# Patient Record
Sex: MALE | Race: BLACK OR AFRICAN AMERICAN | NOT HISPANIC OR LATINO | URBAN - METROPOLITAN AREA
[De-identification: names, ages, dates, MRNs, and addresses within clinical notes are randomized per-mention and may not be internally consistent; named-entity substitution may affect disease eponyms.]

---

## 2018-06-07 PROBLEM — Z00.00 ENCOUNTER FOR PREVENTIVE HEALTH EXAMINATION: Status: ACTIVE | Noted: 2018-06-07

## 2018-06-18 ENCOUNTER — OUTPATIENT (OUTPATIENT)
Dept: OUTPATIENT SERVICES | Facility: HOSPITAL | Age: 68
LOS: 1 days | End: 2018-06-18
Payer: MEDICARE

## 2018-06-18 ENCOUNTER — APPOINTMENT (OUTPATIENT)
Dept: MRI IMAGING | Facility: HOSPITAL | Age: 68
End: 2018-06-18

## 2018-06-18 PROCEDURE — A9585: CPT

## 2018-06-18 PROCEDURE — 70552 MRI BRAIN STEM W/DYE: CPT | Mod: 26

## 2018-06-18 PROCEDURE — 70552 MRI BRAIN STEM W/DYE: CPT

## 2018-10-05 ENCOUNTER — EMERGENCY (EMERGENCY)
Facility: HOSPITAL | Age: 68
LOS: 1 days | Discharge: HOME | End: 2018-10-05
Attending: EMERGENCY MEDICINE | Admitting: EMERGENCY MEDICINE
Payer: MEDICARE

## 2018-10-05 VITALS
OXYGEN SATURATION: 100 % | SYSTOLIC BLOOD PRESSURE: 163 MMHG | TEMPERATURE: 98 F | DIASTOLIC BLOOD PRESSURE: 88 MMHG | RESPIRATION RATE: 20 BRPM | HEART RATE: 56 BPM

## 2018-10-05 DIAGNOSIS — R10.84 GENERALIZED ABDOMINAL PAIN: ICD-10-CM

## 2018-10-05 DIAGNOSIS — I10 ESSENTIAL (PRIMARY) HYPERTENSION: ICD-10-CM

## 2018-10-05 LAB
ALBUMIN SERPL ELPH-MCNC: 5.2 G/DL — HIGH (ref 3.3–5)
ALP SERPL-CCNC: 85 U/L — SIGNIFICANT CHANGE UP (ref 40–120)
ALT FLD-CCNC: 13 U/L — SIGNIFICANT CHANGE UP (ref 10–45)
ANION GAP SERPL CALC-SCNC: 21 MMOL/L — HIGH (ref 5–17)
APPEARANCE UR: CLEAR — SIGNIFICANT CHANGE UP
APTT BLD: 36 SEC — SIGNIFICANT CHANGE UP (ref 27.5–37.4)
AST SERPL-CCNC: 27 U/L — SIGNIFICANT CHANGE UP (ref 10–40)
BACTERIA # UR AUTO: PRESENT /HPF
BASOPHILS NFR BLD AUTO: 0.2 % — SIGNIFICANT CHANGE UP (ref 0–2)
BILIRUB SERPL-MCNC: 0.5 MG/DL — SIGNIFICANT CHANGE UP (ref 0.2–1.2)
BILIRUB UR-MCNC: NEGATIVE — SIGNIFICANT CHANGE UP
BUN SERPL-MCNC: 12 MG/DL — SIGNIFICANT CHANGE UP (ref 7–23)
CALCIUM SERPL-MCNC: 10 MG/DL — SIGNIFICANT CHANGE UP (ref 8.4–10.5)
CHLORIDE SERPL-SCNC: 100 MMOL/L — SIGNIFICANT CHANGE UP (ref 96–108)
CO2 SERPL-SCNC: 22 MMOL/L — SIGNIFICANT CHANGE UP (ref 22–31)
COLOR SPEC: YELLOW — SIGNIFICANT CHANGE UP
COMMENT - URINE: SIGNIFICANT CHANGE UP
CREAT SERPL-MCNC: 0.83 MG/DL — SIGNIFICANT CHANGE UP (ref 0.5–1.3)
DIFF PNL FLD: ABNORMAL
GLUCOSE SERPL-MCNC: 131 MG/DL — HIGH (ref 70–99)
GLUCOSE UR QL: NEGATIVE — SIGNIFICANT CHANGE UP
HCT VFR BLD CALC: 39.8 % — SIGNIFICANT CHANGE UP (ref 39–50)
HGB BLD-MCNC: 13.4 G/DL — SIGNIFICANT CHANGE UP (ref 13–17)
INR BLD: 1.12 — SIGNIFICANT CHANGE UP (ref 0.88–1.16)
KETONES UR-MCNC: >=80 MG/DL
LACTATE SERPL-SCNC: 1.5 MMOL/L — SIGNIFICANT CHANGE UP (ref 0.5–2)
LEUKOCYTE ESTERASE UR-ACNC: NEGATIVE — SIGNIFICANT CHANGE UP
LIDOCAIN IGE QN: 24 U/L — SIGNIFICANT CHANGE UP (ref 7–60)
LYMPHOCYTES # BLD AUTO: 13.1 % — SIGNIFICANT CHANGE UP (ref 13–44)
MCHC RBC-ENTMCNC: 23.4 PG — LOW (ref 27–34)
MCHC RBC-ENTMCNC: 33.7 G/DL — SIGNIFICANT CHANGE UP (ref 32–36)
MCV RBC AUTO: 69.5 FL — LOW (ref 80–100)
MONOCYTES NFR BLD AUTO: 5.1 % — SIGNIFICANT CHANGE UP (ref 2–14)
NEUTROPHILS NFR BLD AUTO: 81.6 % — HIGH (ref 43–77)
NITRITE UR-MCNC: NEGATIVE — SIGNIFICANT CHANGE UP
PH UR: 6.5 — SIGNIFICANT CHANGE UP (ref 5–8)
PLATELET # BLD AUTO: 303 K/UL — SIGNIFICANT CHANGE UP (ref 150–400)
POTASSIUM SERPL-MCNC: 3.5 MMOL/L — SIGNIFICANT CHANGE UP (ref 3.5–5.3)
POTASSIUM SERPL-SCNC: 3.5 MMOL/L — SIGNIFICANT CHANGE UP (ref 3.5–5.3)
PROT SERPL-MCNC: 8.2 G/DL — SIGNIFICANT CHANGE UP (ref 6–8.3)
PROT UR-MCNC: ABNORMAL MG/DL
PROTHROM AB SERPL-ACNC: 12.5 SEC — SIGNIFICANT CHANGE UP (ref 9.8–12.7)
RBC # BLD: 5.73 M/UL — SIGNIFICANT CHANGE UP (ref 4.2–5.8)
RBC # FLD: 18.1 % — HIGH (ref 10.3–16.9)
RBC CASTS # UR COMP ASSIST: < 5 /HPF — SIGNIFICANT CHANGE UP
SODIUM SERPL-SCNC: 143 MMOL/L — SIGNIFICANT CHANGE UP (ref 135–145)
SP GR SPEC: 1.02 — SIGNIFICANT CHANGE UP (ref 1–1.03)
UROBILINOGEN FLD QL: 0.2 E.U./DL — SIGNIFICANT CHANGE UP
WBC # BLD: 5.3 K/UL — SIGNIFICANT CHANGE UP (ref 3.8–10.5)
WBC # FLD AUTO: 5.3 K/UL — SIGNIFICANT CHANGE UP (ref 3.8–10.5)
WBC UR QL: < 5 /HPF — SIGNIFICANT CHANGE UP

## 2018-10-05 PROCEDURE — 93005 ELECTROCARDIOGRAM TRACING: CPT

## 2018-10-05 PROCEDURE — 93010 ELECTROCARDIOGRAM REPORT: CPT

## 2018-10-05 PROCEDURE — 71045 X-RAY EXAM CHEST 1 VIEW: CPT | Mod: 26

## 2018-10-05 PROCEDURE — 74177 CT ABD & PELVIS W/CONTRAST: CPT

## 2018-10-05 PROCEDURE — 99284 EMERGENCY DEPT VISIT MOD MDM: CPT | Mod: 25

## 2018-10-05 PROCEDURE — 96375 TX/PRO/DX INJ NEW DRUG ADDON: CPT

## 2018-10-05 PROCEDURE — 83690 ASSAY OF LIPASE: CPT

## 2018-10-05 PROCEDURE — 85610 PROTHROMBIN TIME: CPT

## 2018-10-05 PROCEDURE — 87086 URINE CULTURE/COLONY COUNT: CPT

## 2018-10-05 PROCEDURE — 96374 THER/PROPH/DIAG INJ IV PUSH: CPT | Mod: XU

## 2018-10-05 PROCEDURE — 85730 THROMBOPLASTIN TIME PARTIAL: CPT

## 2018-10-05 PROCEDURE — 81001 URINALYSIS AUTO W/SCOPE: CPT

## 2018-10-05 PROCEDURE — 80053 COMPREHEN METABOLIC PANEL: CPT

## 2018-10-05 PROCEDURE — 85025 COMPLETE CBC W/AUTO DIFF WBC: CPT

## 2018-10-05 PROCEDURE — 74019 RADEX ABDOMEN 2 VIEWS: CPT | Mod: 26

## 2018-10-05 PROCEDURE — 83605 ASSAY OF LACTIC ACID: CPT

## 2018-10-05 PROCEDURE — 74019 RADEX ABDOMEN 2 VIEWS: CPT

## 2018-10-05 PROCEDURE — 71045 X-RAY EXAM CHEST 1 VIEW: CPT

## 2018-10-05 PROCEDURE — 74177 CT ABD & PELVIS W/CONTRAST: CPT | Mod: 26

## 2018-10-05 PROCEDURE — 36415 COLL VENOUS BLD VENIPUNCTURE: CPT

## 2018-10-05 RX ORDER — ONDANSETRON 8 MG/1
4 TABLET, FILM COATED ORAL ONCE
Qty: 0 | Refills: 0 | Status: COMPLETED | OUTPATIENT
Start: 2018-10-05 | End: 2018-10-05

## 2018-10-05 RX ORDER — SODIUM CHLORIDE 9 MG/ML
1000 INJECTION INTRAMUSCULAR; INTRAVENOUS; SUBCUTANEOUS ONCE
Qty: 0 | Refills: 0 | Status: COMPLETED | OUTPATIENT
Start: 2018-10-05 | End: 2018-10-05

## 2018-10-05 RX ORDER — IOHEXOL 300 MG/ML
30 INJECTION, SOLUTION INTRAVENOUS ONCE
Qty: 0 | Refills: 0 | Status: COMPLETED | OUTPATIENT
Start: 2018-10-05 | End: 2018-10-05

## 2018-10-05 RX ORDER — MORPHINE SULFATE 50 MG/1
4 CAPSULE, EXTENDED RELEASE ORAL ONCE
Qty: 0 | Refills: 0 | Status: DISCONTINUED | OUTPATIENT
Start: 2018-10-05 | End: 2018-10-05

## 2018-10-05 RX ADMIN — SODIUM CHLORIDE 2000 MILLILITER(S): 9 INJECTION INTRAMUSCULAR; INTRAVENOUS; SUBCUTANEOUS at 21:37

## 2018-10-05 RX ADMIN — IOHEXOL 30 MILLILITER(S): 300 INJECTION, SOLUTION INTRAVENOUS at 21:37

## 2018-10-05 RX ADMIN — SODIUM CHLORIDE 1000 MILLILITER(S): 9 INJECTION INTRAMUSCULAR; INTRAVENOUS; SUBCUTANEOUS at 21:06

## 2018-10-05 RX ADMIN — MORPHINE SULFATE 4 MILLIGRAM(S): 50 CAPSULE, EXTENDED RELEASE ORAL at 21:06

## 2018-10-05 RX ADMIN — ONDANSETRON 4 MILLIGRAM(S): 8 TABLET, FILM COATED ORAL at 21:06

## 2018-10-05 NOTE — ED ADULT NURSE REASSESSMENT NOTE - NS ED NURSE REASSESS COMMENT FT1
pt. c/o strong pain to lower abdomen, vomited once clear liquid, turned diaphoretic, zofran and morphine were administered and pt. was sent to xray to r/o perforation.

## 2018-10-05 NOTE — ED ADULT NURSE NOTE - CAS EDN DISCHARGE ASSESSMENT
Alert and oriented to person, place and time/BP elevated, as per pt. he did not take his meds tonight, pt. instructed to take it when home, pt. denies any symptoms.

## 2018-10-06 VITALS
DIASTOLIC BLOOD PRESSURE: 80 MMHG | RESPIRATION RATE: 18 BRPM | OXYGEN SATURATION: 97 % | HEART RATE: 56 BPM | TEMPERATURE: 98 F | SYSTOLIC BLOOD PRESSURE: 180 MMHG

## 2018-10-06 NOTE — ED PROVIDER NOTE - MEDICAL DECISION MAKING DETAILS
abd pain after colonscopy, initial concern for perforation, none on CT and pain improved, stable for dc w strict return instructions.

## 2018-10-06 NOTE — ED PROVIDER NOTE - OBJECTIVE STATEMENT
67 yo with diffuse strong 8/10 non radiating abd pain since dc from colonscopy, worsening, several loose BM's no black or bloody stools, 3 polyps removed no fevers + gassy feeling.

## 2018-10-06 NOTE — ED PROVIDER NOTE - CONSTITUTIONAL, MLM
normal... Well appearing, well nourished, awake, alert, oriented to person, place, time/situation unwell appeairng, diaphoretic

## 2018-10-07 LAB
CULTURE RESULTS: NO GROWTH — SIGNIFICANT CHANGE UP
SPECIMEN SOURCE: SIGNIFICANT CHANGE UP

## 2019-05-18 PROBLEM — I10 ESSENTIAL (PRIMARY) HYPERTENSION: Chronic | Status: ACTIVE | Noted: 2018-10-05

## 2019-06-17 ENCOUNTER — APPOINTMENT (OUTPATIENT)
Dept: NEUROLOGY | Facility: CLINIC | Age: 69
End: 2019-06-17
Payer: MEDICARE

## 2019-06-17 VITALS
SYSTOLIC BLOOD PRESSURE: 140 MMHG | WEIGHT: 126 LBS | BODY MASS INDEX: 19.54 KG/M2 | DIASTOLIC BLOOD PRESSURE: 90 MMHG | HEART RATE: 79 BPM | HEIGHT: 67.5 IN | OXYGEN SATURATION: 97 %

## 2019-06-17 PROCEDURE — 99205 OFFICE O/P NEW HI 60 MIN: CPT

## 2019-06-17 NOTE — HISTORY OF PRESENT ILLNESS
[FreeTextEntry1] : Reason for consult: pain\par \par HPI: HA MIRANDA is a 69 year old man \par \par Several months ago, pain in L neck and L shoulder going down forearm, down to hand as well, all finger tips. This improved after 1 month. Shortly after, developed the same pain down the R arm, electrical pain lasting several seconds to minutes, shooting down from neck to hand, with some tightness at elbow. no triggers. No numbness other than at night when sleeping on the arm that gets better after repositioning. No weakness. No bowel or bladder dysfunction. no leg symptoms, no gait dysfunction. has not tried OTCs.\par \par ROS/Current Sx:\par 10 point ROS reviewed and scanned.\par \par PMHX:\par HTN\par \par MEDS:\par amlodipine\par \par ALL:\par nkda\par \par SHx: no tob, occ etoh, no drugs.\par \par FHx: no neuro.\par \par Vitals: mildly hypertensive, i advised him to discuss this w pmd.\par \par Exam:\par \par AO3.  Normally conversant.  Follows commands, names, and repeats.  Good attention.\par \par PERRL, VFF, EOMI, no nystagmus, face asymmetric but not clearly weak, TUP at midline.\par \par Motor: \par                                                 R:                               L:\par Del                                           5                                5\par Bi                                              5                               5\par Tri                                            5                               5\par Wrist Extensors                      5                               5\par Finger abductors                    5                               5\par                                         5                               5 \par \par HF                                           5                               5\par KE                                           5                               5\par KF                                           5                               5\par DF                                           5                               5\par PF                                           5                               5\par \par Tone                                       R                               L\par UE                                          0                                0 \par LE                                          0                                0\par \par Sensory                                RUE                      LUE                 RLE                LLE     \par LT                                           +                            +                      +                   +\par Vib                                          +                            +                      +                   +\par JPS                                         +                            +                      +                   +\par PP                                         +                            +                      +                   +\par Temp                                     +                            +                      +                   +\par \par Reflexes:\par                                              R                             L                            \par Biceps                                  2                             2\par BR                                        2                             2\par Triceps                                2                              2\par Pat                                        2                            2 \par AJ                                        2                             2\par \par TOES                                    F                            F\par \par \par Coordination:\par                                              R                             L                       \par FTN                                       0                             0 \par COREY                                      0                            0\par HTS                                      0                             0 \par \par Other                                                                          \par  \par \par Gait:  normal, can heel, toe, tandem\par \par                     Assistance: none\par \par \par AP: 68yo w/ L arm pain several months ago that subsided, now with worsened R arm pain X 1 month. Pain is of unclear etiology but per description may be radicular. No numbness or weakness other than when laying down on right side, and this resolves. Face is asymmetric but not clearly weak on either side.  Neurological exam is otherwise normal and reassuring. doubt myelopathy but will r/o with MRI.\par \par - PT, nsaids prn\par - MRI C spine\par - referral to orthopedics\par - RTC after testing and PT\par \par \par \par

## 2019-06-24 ENCOUNTER — APPOINTMENT (OUTPATIENT)
Dept: SURGERY | Facility: CLINIC | Age: 69
End: 2019-06-24
Payer: MEDICARE

## 2019-06-24 VITALS
HEIGHT: 67.5 IN | OXYGEN SATURATION: 97 % | SYSTOLIC BLOOD PRESSURE: 151 MMHG | TEMPERATURE: 98.2 F | DIASTOLIC BLOOD PRESSURE: 92 MMHG | WEIGHT: 124.25 LBS | HEART RATE: 72 BPM | BODY MASS INDEX: 19.27 KG/M2

## 2019-06-24 PROCEDURE — 99203 OFFICE O/P NEW LOW 30 MIN: CPT

## 2019-07-08 ENCOUNTER — FORM ENCOUNTER (OUTPATIENT)
Age: 69
End: 2019-07-08

## 2019-07-09 ENCOUNTER — OUTPATIENT (OUTPATIENT)
Dept: OUTPATIENT SERVICES | Facility: HOSPITAL | Age: 69
LOS: 1 days | End: 2019-07-09
Payer: MEDICARE

## 2019-07-09 ENCOUNTER — APPOINTMENT (OUTPATIENT)
Dept: ORTHOPEDIC SURGERY | Facility: CLINIC | Age: 69
End: 2019-07-09
Payer: MEDICARE

## 2019-07-09 VITALS
SYSTOLIC BLOOD PRESSURE: 130 MMHG | DIASTOLIC BLOOD PRESSURE: 80 MMHG | OXYGEN SATURATION: 97 % | BODY MASS INDEX: 19.39 KG/M2 | HEIGHT: 67.5 IN | HEART RATE: 74 BPM | WEIGHT: 125 LBS

## 2019-07-09 DIAGNOSIS — Z60.2 PROBLEMS RELATED TO LIVING ALONE: ICD-10-CM

## 2019-07-09 DIAGNOSIS — Z78.9 OTHER SPECIFIED HEALTH STATUS: ICD-10-CM

## 2019-07-09 PROCEDURE — 99204 OFFICE O/P NEW MOD 45 MIN: CPT

## 2019-07-09 PROCEDURE — 72050 X-RAY EXAM NECK SPINE 4/5VWS: CPT | Mod: 26

## 2019-07-09 PROCEDURE — 72050 X-RAY EXAM NECK SPINE 4/5VWS: CPT

## 2019-07-09 RX ORDER — AMLODIPINE BESYLATE 10 MG/1
10 TABLET ORAL DAILY
Refills: 0 | Status: ACTIVE | COMMUNITY

## 2019-07-09 SDOH — SOCIAL STABILITY - SOCIAL INSECURITY: PROBLEMS RELATED TO LIVING ALONE: Z60.2
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
## 2019-07-09 NOTE — PHYSICAL EXAM
[de-identified] : XR cervical 7/9/19: multilevel degenerative changes, no spondylolisthesis or dynamic instability, no acute fractures [de-identified] : Physical Exam:\par \par General: patient is well developed, well nourished, in no acute \par distress, alert and oriented x 3. \par \par Mood and affect: normal\par \par Respiratory: no respiratory distress noted\par \par Skin: no scars over spine, skin intact, no erythema, increased warmth\par \par Alignment:The spine is well compensated in the coronal and sagittal plane. There is no asymmetry on Ken Forward Bend Test.\par \par Gait: The patient is able to toe walk and heel walk without difficulty. The patient is able to tandem walk without difficulty.\par \par Palpation: no tenderness to palpation midline along spine or paraspinal region\par \par Range of motion: Cervical and Lumbar spine ROM is full\par \par Neurologic Exam:\par Motor: Manual Muscle testing in the upper and lower extremities is 5 out of 5 in all muscle groups. There is no evidence of muscular atrophy in the upper extremities. Sensory: Sensation to light touch is grossly intact in the upper and lower extremities\par \par Reflexes: DTR are present and symmetric throughout, negative davies bilaterally, negative inverted radial reflex bilaterally, no clonus, plantar responses are flexor\par \par Special tests: Spurlings sign absent. Lhermitte's sign is absent. Straight Leg Raise Negative, Cross Straight Leg Raise Negative, LUTHER Test Negative\par \par Hip Exam: Full painless ROM of bilateral hips\par \par Vascular: Examination of the peripheral vascular system demonstrates no evidence of congestion or edema. no lymphedema bilateral lower extremities, pulses are present and symmetric in both lower extremities.\par \par \par
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
## 2019-07-21 ENCOUNTER — FORM ENCOUNTER (OUTPATIENT)
Age: 69
End: 2019-07-21

## 2019-07-22 ENCOUNTER — APPOINTMENT (OUTPATIENT)
Dept: MRI IMAGING | Facility: CLINIC | Age: 69
End: 2019-07-22
Payer: MEDICARE

## 2019-07-22 ENCOUNTER — OUTPATIENT (OUTPATIENT)
Dept: OUTPATIENT SERVICES | Facility: HOSPITAL | Age: 69
LOS: 1 days | End: 2019-07-22

## 2019-07-22 PROCEDURE — 72141 MRI NECK SPINE W/O DYE: CPT | Mod: 26

## 2019-07-30 ENCOUNTER — APPOINTMENT (OUTPATIENT)
Dept: ORTHOPEDIC SURGERY | Facility: CLINIC | Age: 69
End: 2019-07-30
Payer: MEDICARE

## 2019-07-30 DIAGNOSIS — M48.02 SPINAL STENOSIS, CERVICAL REGION: ICD-10-CM

## 2019-07-30 DIAGNOSIS — M54.12 RADICULOPATHY, CERVICAL REGION: ICD-10-CM

## 2019-07-30 PROCEDURE — 99213 OFFICE O/P EST LOW 20 MIN: CPT

## 2019-08-22 ENCOUNTER — APPOINTMENT (OUTPATIENT)
Dept: ORTHOPEDIC SURGERY | Facility: CLINIC | Age: 69
End: 2019-08-22
Payer: MEDICARE

## 2019-08-22 VITALS — HEIGHT: 67.5 IN | WEIGHT: 125 LBS | BODY MASS INDEX: 19.39 KG/M2 | RESPIRATION RATE: 16 BRPM

## 2019-08-22 DIAGNOSIS — M25.532 PAIN IN LEFT WRIST: ICD-10-CM

## 2019-08-22 DIAGNOSIS — M25.521 PAIN IN RIGHT ELBOW: ICD-10-CM

## 2019-08-22 DIAGNOSIS — M25.531 PAIN IN RIGHT WRIST: ICD-10-CM

## 2019-08-22 DIAGNOSIS — M25.522 PAIN IN LEFT ELBOW: ICD-10-CM

## 2019-08-22 PROCEDURE — 99203 OFFICE O/P NEW LOW 30 MIN: CPT

## 2019-08-22 PROCEDURE — 73110 X-RAY EXAM OF WRIST: CPT | Mod: 50

## 2019-08-22 PROCEDURE — 73070 X-RAY EXAM OF ELBOW: CPT | Mod: 50

## 2019-08-29 ENCOUNTER — APPOINTMENT (OUTPATIENT)
Dept: PHYSICAL MEDICINE AND REHAB | Facility: CLINIC | Age: 69
End: 2019-08-29

## 2019-09-09 PROBLEM — M48.02 CERVICAL STENOSIS OF SPINE: Status: ACTIVE | Noted: 2019-09-09

## 2019-09-09 PROBLEM — M54.12 CERVICAL RADICULAR PAIN: Status: ACTIVE | Noted: 2019-06-17
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
## 2019-09-09 NOTE — PHYSICAL EXAM
[de-identified] : Physical Exam:\par \par General: patient is well developed, well nourished, in no acute \par distress, alert and oriented x 3. \par \par Mood and affect: normal\par \par Respiratory: no respiratory distress noted\par \par Skin: no scars over spine, skin intact, no erythema, increased warmth\par \par Alignment:The spine is well compensated in the coronal and sagittal plane. There is no asymmetry on Ken Forward Bend Test.\par \par Gait: The patient is able to toe walk and heel walk without difficulty. The patient is able to tandem walk without difficulty.\par \par Palpation: no tenderness to palpation midline along spine or paraspinal region\par \par Range of motion: Cervical and Lumbar spine ROM is full\par \par Neurologic Exam:\par Motor: Manual Muscle testing in the upper and lower extremities is 5 out of 5 in all muscle groups. There is no evidence of muscular atrophy in the upper extremities. Sensory: Sensation to light touch is grossly intact in the upper and lower extremities\par \par Reflexes: DTR are present and symmetric throughout, negative davies bilaterally, negative inverted radial reflex bilaterally, no clonus, plantar responses are flexor\par \par Special tests: Spurlings sign absent. Lhermitte's sign is absent. Straight Leg Raise Negative, Cross Straight Leg Raise Negative, LUTHER Test Negative\par \par Hip Exam: Full painless ROM of bilateral hips\par \par Vascular: Examination of the peripheral vascular system demonstrates no evidence of congestion or edema. no lymphedema bilateral lower extremities, pulses are present and symmetric in both lower extremities.\par  [de-identified] : MRI cervical 7/22/19: multilevel degenerative changes, resulting in moderate/severe bilateral foraminal stenosis C3/C4 through C5/C6, C7/T1; small punctate focus of cord signal change right cord at C3/C4, of indeterminate etiology, very likely chronic, no cord compression\par \par XR cervical 7/9/19: multilevel degenerative changes, no spondylolisthesis or dynamic instability, no acute fractures. \par \par

## 2019-09-09 NOTE — DISCUSSION/SUMMARY
[de-identified] : Discussed the results of the patient's history, physical exam, and imaging. Mr. Ramesh reports significant improvement in neck and upper extremity radicular pain since last visit. Primary complaint now is paresthesias that extend from both forearms to all fingers. Neurologically intact. MRI cervical shows multilevel moderate/severe bilateral foraminal stenosis, no cord compression. There is no indication for surgical intervention at this time. I am recommending an evaluation by hand surgery to rule out a peripheral cause of hand paresthesias, referral given.  The patient will follow up with me as needed.  All questions were answered.\par \par

## 2019-10-02 PROBLEM — Z60.2 PERSON LIVING ALONE: Status: ACTIVE | Noted: 2019-07-09

## 2019-10-11 ENCOUNTER — OUTPATIENT (OUTPATIENT)
Dept: OUTPATIENT SERVICES | Facility: HOSPITAL | Age: 69
LOS: 1 days | Discharge: HOME | End: 2019-10-11
Payer: MEDICARE

## 2019-10-11 ENCOUNTER — RX RENEWAL (OUTPATIENT)
Age: 69
End: 2019-10-11

## 2019-10-11 PROCEDURE — 49650 LAP ING HERNIA REPAIR INIT: CPT | Mod: RT

## 2019-10-11 PROCEDURE — S2900 ROBOTIC SURGICAL SYSTEM: CPT | Mod: NC

## 2019-10-11 RX ORDER — DOCUSATE SODIUM 100 MG
1 CAPSULE ORAL
Qty: 14 | Refills: 0
Start: 2019-10-11 | End: 2019-10-17

## 2019-10-11 RX ORDER — OXYCODONE HYDROCHLORIDE 5 MG/1
1 TABLET ORAL
Qty: 15 | Refills: 0
Start: 2019-10-11 | End: 2019-10-14

## 2019-10-21 ENCOUNTER — APPOINTMENT (OUTPATIENT)
Dept: SURGERY | Facility: CLINIC | Age: 69
End: 2019-10-21
Payer: MEDICARE

## 2019-10-21 VITALS
SYSTOLIC BLOOD PRESSURE: 131 MMHG | TEMPERATURE: 97.7 F | OXYGEN SATURATION: 98 % | HEIGHT: 67.5 IN | WEIGHT: 124.5 LBS | HEART RATE: 84 BPM | DIASTOLIC BLOOD PRESSURE: 86 MMHG | BODY MASS INDEX: 19.31 KG/M2

## 2019-10-21 DIAGNOSIS — K40.90 UNILATERAL INGUINAL HERNIA, W/OUT OBSTRUCTION OR GANGRENE, NOT SPECIFIED AS RECURRENT: ICD-10-CM

## 2019-10-21 PROCEDURE — 99024 POSTOP FOLLOW-UP VISIT: CPT

## 2019-11-06 PROBLEM — K40.90 RIGHT INGUINAL HERNIA: Status: ACTIVE | Noted: 2019-07-09

## 2019-11-06 NOTE — PHYSICAL EXAM
[Normal Breath Sounds] : Normal breath sounds [Normal Heart Sounds] : normal heart sounds [Alert] : alert [Oriented to Person] : oriented to person [Oriented to Place] : oriented to place [Oriented to Time] : oriented to time [Calm] : calm [JVD] : no jugular venous distention  [Abdominal Masses] : No abdominal masses [Abdomen Tenderness] : ~T ~M No abdominal tenderness [Tender] : was nontender [Enlarged] : not enlarged [Purpura] : no purpura  [de-identified] : TESSA. Pam. Appropriate. Comfortable. [de-identified] : Pupils equal. No Scleral Icterus. NCAT. [de-identified] : Supple. Trachea midline. No overt lymphadenopathy. No JVD [de-identified] : Abdomen is soft, non tender and non distended. Do not appreciate any overt mass. No overt hernia detected. the incisions are healing well. No recurrence noted clinically.

## 2019-11-06 NOTE — ASSESSMENT
[FreeTextEntry1] : 69 year old male. Doing well s/p hernia surgery. We discussed natural scar maturation and gradual return to normal activity. I answered all questions. May return as needed.

## 2019-11-06 NOTE — HISTORY OF PRESENT ILLNESS
[de-identified] : 69 year old male. Now s/p minimally invasive repair of right inguinal hernia with mesh. Doing well. Denies any pain, fevers, or issues, No issues with urination and he feels great.

## 2023-04-26 NOTE — ED ADULT NURSE NOTE - OBJECTIVE STATEMENT
Called and lm on [employer] to call back and schedule a physical appt. [employer] message also sent. pt. presented with c/o lower abdomen pain, n/v/d s/p colonoscopy in the morning, pt. denies chest pain, difficulty breathing, dizziness, blood in stool or vomit, any urinary symptoms.

## 2023-05-10 NOTE — ED PROVIDER NOTE - TOBACCO USE
Impression: Corneal abrasion of right eye, initial encounter: S05. 01XA. Plan: bandage lens removed in office. epithelium now closed. mild iritis. stop ofloxacin. start maxitrol qid OD x 1 week then d/c.
Never smoker

## 2023-05-27 ENCOUNTER — APPOINTMENT (OUTPATIENT)
Dept: CT IMAGING | Facility: HOSPITAL | Age: 73
End: 2023-05-27
Payer: MEDICARE

## 2023-05-27 ENCOUNTER — OUTPATIENT (OUTPATIENT)
Dept: OUTPATIENT SERVICES | Facility: HOSPITAL | Age: 73
LOS: 1 days | End: 2023-05-27
Payer: MEDICARE

## 2023-05-27 LAB — POCT ISTAT CREATININE: 1.1 MG/DL — SIGNIFICANT CHANGE UP (ref 0.5–1.3)

## 2023-05-27 PROCEDURE — 71275 CT ANGIOGRAPHY CHEST: CPT

## 2023-05-27 PROCEDURE — 71275 CT ANGIOGRAPHY CHEST: CPT | Mod: 26,MH

## 2023-05-27 PROCEDURE — 82565 ASSAY OF CREATININE: CPT

## 2023-05-31 ENCOUNTER — APPOINTMENT (OUTPATIENT)
Dept: CARDIOTHORACIC SURGERY | Facility: CLINIC | Age: 73
End: 2023-05-31
Payer: MEDICARE

## 2023-05-31 ENCOUNTER — NON-APPOINTMENT (OUTPATIENT)
Age: 73
End: 2023-05-31

## 2023-05-31 VITALS
HEART RATE: 95 BPM | SYSTOLIC BLOOD PRESSURE: 136 MMHG | BODY MASS INDEX: 17.07 KG/M2 | DIASTOLIC BLOOD PRESSURE: 101 MMHG | RESPIRATION RATE: 16 BRPM | WEIGHT: 110 LBS | HEIGHT: 67.5 IN | OXYGEN SATURATION: 97 % | TEMPERATURE: 97.6 F

## 2023-05-31 DIAGNOSIS — I71.21 ANEURYSM OF THE ASCENDING AORTA, WITHOUT RUPTURE: ICD-10-CM

## 2023-05-31 PROCEDURE — 99204 OFFICE O/P NEW MOD 45 MIN: CPT

## 2023-06-01 PROBLEM — I71.21 ANEURYSM OF ASCENDING AORTA: Status: ACTIVE | Noted: 2023-05-08

## 2023-06-02 NOTE — ASSESSMENT
[FreeTextEntry1] : 73 year old male with a past medical history of hypertension, DM, HLD, presents for evaluation and management of thoracic aortic aneurysm. \par \par The patient's medical records and diagnostic images were reviewed at the time of this office visit, and the following recommendation was made. Patient does not meet criteria for surgical intervention at the time and will be entered into the aortic registry surveillance program.\par \par Plan\par \par - [facility] up [facility] year with CTA chest. \par - Continue medication regimen.\par - Follow up with cardiologist and PCP.\par - Blood pressure management.\par - Discussed signs and symptoms that warrant emergency medical attention.\par

## 2023-06-02 NOTE — CONSULT LETTER
[Dear  ___] : Dear  [unfilled], [Please see my note below.] : Please see my note below. [FreeTextEntry2] : Alyssa Lua MD [FreeTextEntry1] : Please find attached our consultation on your patient, Mr. HA MIRANDA . \par \par We take a multidisciplinary team approach to patient care and consider you, the referring physician, an extension of our team. We will maintain an open line of communication with you throughout your patient's treatment course.  \par \par It is our commitment to provide your patient with the highest quality of advanced therapeutic options. We thank you for allowing us to participate in the care of your patient.\par \par Please do not hesitate to contact our team with any questions or concerns at [phone]. \par \par  [FreeTextEntry3] : Sincerely, \par \par \par \par \par \par Gonzalez Cheney M.D.\par Professor of Cardiovascular and Thoracic Surgery\par Minimally Invasive Valve Surgeon\par Director of Aortic Surgery, [facility]\par Cell: [phone]\par Email: [email or web address] \par \par [facility]:\par [address], [address], [place] [address]\par Office: [phone]\par Fax: [phone]\par \par [facility]:\par Department of Cardiovascular and Thoracic Surgery\par [address] [place] [address], [address]\par Office: [phone]\par Fax: [phone]\par \par Practice Manager: Ms. Porsha Mary\par Email: [email or web address]\par Phone: [phone]\par \par \par \par

## 2023-06-02 NOTE — HISTORY OF PRESENT ILLNESS
[FreeTextEntry1] : 73 year old male with a past medical history of hypertension, DM, HLD, presents for evaluation and management of thoracic aortic aneurysm. \par Patient is referred by Dr. Alyssa Lua. \par \par TTE 4/17/23\par EF 60%. aortic valve has trileaflet configuration. no aortic stenosis. no trivial aortic regurgitation. aortic root and ascending aorta dilatation \par \par CTA chest 5/27/23\par LUNGS AND AIRWAYS: Patent central airways.  Centrilobular emphysematous changes. Stable calcified granulomas bilaterally. No suspicious pulmonary nodules.\par PLEURA: No pleural effusion.\par MEDIASTINUM AND MIKO: No lymphadenopathy.\par VESSELS: Aortic root 4.1 cm. Ascending aorta 4.2 cm. Aortic arch 3.5 cm. Patent branches of the aortic arch. Descending aorta 4.3 cm proximally, 3.7 cm in the midportion and 3.4 cm distally.\par HEART: Heart size is normal. No pericardial effusion.\par CHEST WALL AND LOWER NECK: Within normal limits.\par VISUALIZED UPPER ABDOMEN: 2.2 cm left renal cyst.\par BONES: Mild scoliosis and degenerative changes.\par \par Patient is doing well and denies recent hospitalization, ER visits, or surgeries. He  denies fever, chills, fatigue, headache, blurred vision, dizziness, syncope, chest pain, palpitations, shortness of breath, orthopnea, paroxysmal nocturnal dyspnea, nausea, vomiting, abdominal pain, back pain, BRBPR or swelling to legs.\par \par \par

## 2023-06-02 NOTE — END OF VISIT
[Time Spent: ___ minutes] : I have spent [unfilled] minutes of time on the encounter. [FreeTextEntry3] : \par I, DONNELL ZHANGU , am scribing for and in the presence of SHANICE KIRK the following sections: History of present illness, past Medical/family/surgical/family/social history, review of systems, vital signs, physical exam and disposition.\par \par I personally performed the services described in the documentation, reviewed the documentation recorded by the scribe in my presence and it accurately and completely records my words and actions.\par \par

## 2023-06-02 NOTE — PROCEDURE
[FreeTextEntry1] : Dr. Cheney reviewed the indications for surgery, and used our webpage [email or web address] <[email or web address]> to illustrate the aorta and anatomy of the heart. Those indications are the following: size greater than 5.0 cm, symptomatic aneurysms, family history of aortic dissection or aneurysm death with a size greater than 4.5 cm, other necessary cardiac procedures such as coronary artery bypass grafting or valvular disorders with an aneurysm greater than 4.5 cm, or connective tissue disorders with an aneurysm size greater than 4.5 cm. The patient does not meet size criteria for surgical intervention at the time.\par \par Dr. Cheney discussed activity restrictions with the patient, and would advise exercise at a moderate amount with no heavy lifting over one third of body weight, and avoiding heart rates that exceed 140 beats per minute. In addition, every patient should abstain from tobacco abuse and to avoid all illicit drug use, especially stimulants such as cocaine or methamphetamine. Dr. Cheney also counseled regarding maintaining a healthy heart diet, and losing any excessive weight as this also put undue stress on both the aorta and entire cardiovascular system. First degree family members should be screened for bicuspid valve disease, and ascending aortic aneurysms. \par \par Patient was advised to view the educational video prior to this visit regarding aortic pathology, risk factors, surgical procedures, and lifestyle modifications. Video can be retrieved at [email or web address].\par

## 2023-08-22 ENCOUNTER — APPOINTMENT (OUTPATIENT)
Dept: VASCULAR SURGERY | Facility: CLINIC | Age: 73
End: 2023-08-22
Payer: MEDICARE

## 2023-08-22 VITALS
HEIGHT: 67.5 IN | DIASTOLIC BLOOD PRESSURE: 99 MMHG | WEIGHT: 113 LBS | SYSTOLIC BLOOD PRESSURE: 168 MMHG | HEART RATE: 68 BPM | BODY MASS INDEX: 17.53 KG/M2

## 2023-08-22 DIAGNOSIS — I72.3 ANEURYSM OF ILIAC ARTERY: ICD-10-CM

## 2023-08-22 DIAGNOSIS — I71.43 INFRARENAL ABDOMINAL AORTIC ANEURYSM, WITHOUT RUPTURE: ICD-10-CM

## 2023-08-22 DIAGNOSIS — Z86.79 PERSONAL HISTORY OF OTHER DISEASES OF THE CIRCULATORY SYSTEM: ICD-10-CM

## 2023-08-22 DIAGNOSIS — Z87.891 PERSONAL HISTORY OF NICOTINE DEPENDENCE: ICD-10-CM

## 2023-08-22 PROCEDURE — 99204 OFFICE O/P NEW MOD 45 MIN: CPT

## 2023-08-23 PROBLEM — Z87.891 FORMER SMOKER: Status: ACTIVE | Noted: 2023-08-23

## 2023-08-23 PROBLEM — I72.3 ANEURYSM OF COMMON ILIAC ARTERY: Status: ACTIVE | Noted: 2023-08-23

## 2023-08-23 PROBLEM — Z86.79 HISTORY OF HYPERTENSION: Status: RESOLVED | Noted: 2019-07-09 | Resolved: 2023-08-23

## 2023-08-23 PROBLEM — I71.43 INFRARENAL ABDOMINAL AORTIC ANEURYSM (AAA) WITHOUT RUPTURE: Status: ACTIVE | Noted: 2023-08-23

## 2023-08-23 NOTE — ASSESSMENT
[FreeTextEntry1] : 73yoM, former smoker with HTN, T2DM, HLD, referred by Dr. Jesus for evaluation of abdominal and iliac artery aortic aneurysm. Patient is asymptomatic, no claudication or abdominal/back pain. We reviewed CT C/A/P from [facility] and discussed the findings with the patient. No vascular intervention is needed at this time. Recommend to f/u in 1 year for surveillance US. [Aneurysm Surgery] : aneurysm surgery

## 2023-08-23 NOTE — PHYSICAL EXAM
[Respiratory Effort] : normal respiratory effort [Normal Heart Sounds] : normal heart sounds [2+] : left 2+ [Ankle Swelling (On Exam)] : not present [Abdomen Tenderness] : ~T ~M No abdominal tenderness [Abdominal Bruit] : no abdominal bruit  [No Rash or Lesion] : No rash or lesion [Alert] : alert [Calm] : calm [de-identified] : WN/WD, NAD [de-identified] : NC/AT [de-identified] : supple [de-identified] : +FROM 5/5x4 [de-identified] : grossly intact

## 2023-08-23 NOTE — HISTORY OF PRESENT ILLNESS
[FreeTextEntry1] : 73yoM, former smoker with HTN, T2DM, HLD, referred by Dr. Jesus for evaluation of abdominal and iliac artery aortic aneurysm. He had CT C/A/P with contrast on 7/18/23 that demonstrated New 3.5 cm aneurysm of the upper abdominal aorta at the level of the diaphragmatic hiatus. 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 He denies abdominal, back pain, claudication. No FHx of aneurysm or vascular disease.

## 2023-08-23 NOTE — ADDENDUM
[FreeTextEntry1] : This note was written by Sejal DOHERTY, acting as a scribe for Dr. Rhonda Mary.  I, Dr. Rhonda Mary, have read and attest that all the information, medical decision-making, and discharge instructions within are true and accurate.  I, Dr. Rhonda Mary, personally performed the evaluation and management (E/M) services for this new patient.  That E/M includes conducting the initial examination, assessing all conditions, and establishing the plan of care.  Today, my ACP, Sejal DOHERTY, was here to observe my evaluation and management services for this patient to be followed going forward.

## 2024-04-18 ENCOUNTER — APPOINTMENT (OUTPATIENT)
Dept: COLORECTAL SURGERY | Facility: CLINIC | Age: 74
End: 2024-04-18

## 2024-04-23 ENCOUNTER — NON-APPOINTMENT (OUTPATIENT)
Age: 74
End: 2024-04-23

## 2024-04-24 ENCOUNTER — NON-APPOINTMENT (OUTPATIENT)
Age: 74
End: 2024-04-24

## 2024-04-24 ENCOUNTER — APPOINTMENT (OUTPATIENT)
Dept: COLORECTAL SURGERY | Facility: CLINIC | Age: 74
End: 2024-04-24
Payer: MEDICARE

## 2024-04-24 VITALS
BODY MASS INDEX: 17.37 KG/M2 | SYSTOLIC BLOOD PRESSURE: 129 MMHG | HEIGHT: 67.5 IN | TEMPERATURE: 98 F | DIASTOLIC BLOOD PRESSURE: 89 MMHG | HEART RATE: 78 BPM | WEIGHT: 112 LBS

## 2024-04-24 DIAGNOSIS — Z80.0 FAMILY HISTORY OF MALIGNANT NEOPLASM OF DIGESTIVE ORGANS: ICD-10-CM

## 2024-04-24 DIAGNOSIS — K64.9 UNSPECIFIED HEMORRHOIDS: ICD-10-CM

## 2024-04-24 PROCEDURE — 99203 OFFICE O/P NEW LOW 30 MIN: CPT | Mod: 25

## 2024-04-24 PROCEDURE — 46600 DIAGNOSTIC ANOSCOPY SPX: CPT

## 2024-04-24 RX ORDER — ATORVASTATIN CALCIUM 80 MG/1
TABLET, FILM COATED ORAL
Refills: 0 | Status: ACTIVE | COMMUNITY

## 2024-04-24 RX ORDER — METOPROLOL TARTRATE 75 MG/1
TABLET, FILM COATED ORAL
Refills: 0 | Status: ACTIVE | COMMUNITY

## 2024-04-24 RX ORDER — TIOTROPIUM BROMIDE 18 UG/1
CAPSULE ORAL; RESPIRATORY (INHALATION)
Refills: 0 | Status: ACTIVE | COMMUNITY

## 2024-04-24 NOTE — PHYSICAL EXAM
[FreeTextEntry1] : Medical assistant present for duration of physical examination   General no acute distress, alert and oriented Psych calm, pleasant demeanor, responding appropriately to questions Nonlabored breathing Ambulating without assistance Skin normal color and pigment, no visible lesions or rashes    Anorectal Exam: Inspection no erythema, induration or fluctuance, no skin excoriation, no fissure, soft residual right anterior and posterior external hemorrhoids without acute inflammation or thrombosis NICCI nontender, no masses palpated, no blood on gloved finger   Procedure: Anoscopy   Pre procedure Diagnosis: hemorrhoids Post procedure Diagnosis: hemorrhoids Anesthesia: none Estimated blood loss: none Specimen: none Complications: none   Consent obtained. Anoscopy was performed by passing a lighted anoscope with lubricant jelly into the anal canal and the entire anal mucosal surface was inspected. Findings included no fissure, mild internal hemorrhoids, no visible masses or lesions in anal canal   Patient tolerated examination and procedure well.

## 2024-04-24 NOTE — ASSESSMENT
[FreeTextEntry1] : Prior symptoms likely related to hemorrhoid. No other findings on anal exam, including anoscopy. Currently asymptomatic. High fiber diet, adequate oral hydration. Avoid constipation/diarrhea. F/u if symptoms return. All questions were answered, patient expressed understanding, and is agreeable to this plan.

## 2024-04-24 NOTE — HISTORY OF PRESENT ILLNESS
[FreeTextEntry1] : 75yo male presents for initial evaluation  Reason for visit: "anal lesion" Referred by PCP Dr Jesus  PMH HTN, DM, HLD, abdominal and iliac artery aortic aneurysm (sees Vascular Dr Mary for surveillance), thoracic aortic aneurysm (sees CT surgery Dr Cheney for surveillance) H laparoscopic inguinal hernia repair (Dr Crowley 2019)  Meds (per PCP referral) - amlodipine, ASA 81mg, atorvastatin, Ciclopirox gel, Cyanocobalamin, diclofenac gel, metoprolol XL, omeprazole, Spiriva inhaler, Vitamin D, Albuterol inhaler  Denies FH of CRC/IBD Allergies: NKDA  Last Colonoscopy: Patient thinks he had colonoscopy last year but does not remember name of provider. As per patient he thinks it was normal.   Patient reports about 8 months ago had a bump in anal area that was painful during BM. States after 7 days it went away on its own without use of any topical creams or trial of warm baths. Patient states today no longer has bump, denies any anal/rectal pain, denies any rectal bleeding. Unsure why his PCP referred him here as told PCP he no longer had the bump. States in most recent visit denies rectal exam done.   Denies any hx of rashes or other bumps on rest of body.   Denies any hx of anal sex.   BM: 2x a day, soft, fully formed, denies straining, denies sitting on bowl for prolonged time. Denies use of stool softeners or fiber supplements. Diet high in fruit and vegetables, Drinks 3 bottles of water a day.   On Aspirin 81mg daily.

## 2024-05-29 ENCOUNTER — APPOINTMENT (OUTPATIENT)
Dept: CARDIOTHORACIC SURGERY | Facility: CLINIC | Age: 74
End: 2024-05-29

## 2024-05-31 NOTE — ED PROVIDER NOTE - TEMPLATE, MLM
[de-identified] : General: No distress, well nourished Eyes: Normal Sclera, EOMI, PERRL ENT: Normal appearance of the nose, normal oropharynx Neck/Thyroid: No cervical lymphadenopathy, thyroid gland 20 g in size, no thyroid nodules, non-tender Respiratory: No use of accessory muscles of respiration, vesicular breath sounds heard bilaterally, no crepitations or ronchi Cardiovascular: S1 and S2 heard and normal, no S3 or S4, no murmurs, radial pulse normal bilaterally Abdomen: soft, non-tender, no masses, normal bowel sounds Musculoskeletal: No swelling or deformities of joints of hands, no pedal edema Neurological: Normal range of motion in the hands, Normal brachioradialis reflexes bilaterally Psychiatry: Patient converses normally, good judgement and insight Skin: No rashes in hands, no nodules palpated in hands 
General

## 2024-08-20 ENCOUNTER — APPOINTMENT (OUTPATIENT)
Dept: VASCULAR SURGERY | Facility: CLINIC | Age: 74
End: 2024-08-20
Payer: MEDICARE

## 2024-08-20 VITALS
HEART RATE: 75 BPM | BODY MASS INDEX: 17.37 KG/M2 | WEIGHT: 112 LBS | DIASTOLIC BLOOD PRESSURE: 87 MMHG | SYSTOLIC BLOOD PRESSURE: 153 MMHG | HEIGHT: 67.5 IN

## 2024-08-20 DIAGNOSIS — I72.3 ANEURYSM OF ILIAC ARTERY: ICD-10-CM

## 2024-08-20 DIAGNOSIS — I71.21 ANEURYSM OF THE ASCENDING AORTA, WITHOUT RUPTURE: ICD-10-CM

## 2024-08-20 PROCEDURE — 99213 OFFICE O/P EST LOW 20 MIN: CPT

## 2024-08-20 PROCEDURE — 93978 VASCULAR STUDY: CPT

## 2024-08-20 NOTE — PROCEDURE
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

## 2024-08-20 NOTE — ASSESSMENT
[Aneurysm Surgery] : aneurysm surgery [FreeTextEntry1] : 74yoM, former smoker with HTN, T2DM, HLD, referred by Dr. Jesus for evaluation of abdominal and iliac artery aortic aneurysm. Patient is asymptomatic, no claudication or abdominal/back pain.  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  Reassured pt that his aneurysms are stable but he should RTO in 1y for surveillance.

## 2024-08-20 NOTE — ADDENDUM
[FreeTextEntry1] : This note was written by Ramirez Davies, acting as a scribe for Dr. Rhonda Mary.  I, Dr. Rhonda Mary, have read and attest that all the information, medical decision-making, and discharge instructions within are true and accurate.  I, Dr. Rhonda Mary, personally performed the evaluation and management (E/M) services for this established patient who presents today with (an) existing condition(s).  That E/M includes conducting the examination, assessing all conditions, and (re)establishing/reinforcing a plan of care.  Today, my ACP, Ramirez Davies, was here to observe my evaluation and management services for this condition to be followed going forward.

## 2024-08-20 NOTE — PHYSICAL EXAM
[Respiratory Effort] : normal respiratory effort [Normal Heart Sounds] : normal heart sounds [2+] : left 2+ [No Rash or Lesion] : No rash or lesion [Alert] : alert [Calm] : calm [Ankle Swelling (On Exam)] : not present [Abdomen Tenderness] : ~T ~M No abdominal tenderness [Abdominal Bruit] : no abdominal bruit  [de-identified] : WN/WD, NAD [de-identified] : NC/AT [de-identified] : supple [de-identified] : +FROM 5/5x4 [de-identified] : grossly intact

## 2024-08-20 NOTE — HISTORY OF PRESENT ILLNESS
[FreeTextEntry1] : 74yoM, former smoker with HTN, T2DM, HLD, referred by Dr. Jesus for evaluation of abdominal and iliac artery aortic aneurysm. He had CT C/A/P with contrast on 7/18/23 that demonstrated New 3.5 cm aneurysm of the upper abdominal aorta at the level of the diaphragmatic hiatus. 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 He denies abdominal, back pain, claudication. No FHx of aneurysm or vascular disease.

## 2025-05-15 NOTE — PHYSICAL EXAM
Time reflects when diagnosis was documented in both MDM as applicable and the Disposition within this note       Time User Action Codes Description Comment    5/15/2025  7:26 PM Shashi Stevens Add [I95.9] Hypotension     5/15/2025  7:26 PM Shashi Stevens Add [I95.1] Orthostatic hypotension           ED Disposition       ED Disposition   Admit    Condition   Stable    Date/Time   Thu May 15, 2025  7:02 PM    Comment   Case was discussed with  and the patient's admission status was agreed to be Admission Status: observation status to the service of   .               Assessment & Plan       Medical Decision Making  Patient is a 81 y.o. male who presents to the ED for near syncope, hypotension.  Patient is nontoxic, well-appearing.     Differential includes but is not limited to: Orthostasis, vasovagal.  Possible medication related?    Plan: Labs, IV fluids, reassess.  Likely admit if orthostatics remain positive after fluids                   Amount and/or Complexity of Data Reviewed  Labs: ordered.  Radiology: ordered and independent interpretation performed.     Details: No acute cardiopulmonary disease    Risk  Decision regarding hospitalization.        ED Course as of 05/15/25 1948   Thu May 15, 2025   1927 Reevaluated.  No complaints.  Does still have positive orthostatics.  Will admit.  Discussed with slim.  Accepts admission.       Medications   sodium chloride 0.9 % bolus 500 mL (500 mL Intravenous New Bag 5/15/25 1801)       ED Risk Strat Scores                    No data recorded                            History of Present Illness       Chief Complaint   Patient presents with    Hypotension     Pt was at endocrinologist office and had a low bp reading of 64/55, upon arrival pt alert oriented x4       Past Medical History:   Diagnosis Date    Acute kidney injury superimposed on chronic kidney disease  (HCC) 02/16/2022    Anemia     CAD (coronary artery disease)     Callus     Cancer (HCC)     prostate     CHF (congestive heart failure) (HCC)     Chronic kidney disease     Clotting disorder (HCC)     Coronary artery disease     Deep vein thrombosis (HCC)     Diabetes mellitus (HCC)     Difficulty walking     Duodenal ulcer     Ear problems     Erectile dysfunction     Heart disease 1988    Heart murmur     HL (hearing loss)     Hyperlipidemia     Hypertension     Myocardial infarction (HCC)     Neuropathy     Bilateral feet    Neuropathy in diabetes (HCC)     Plantar fasciitis     Prostate cancer (HCC) 2012    Sleep apnea     Could not tolerate CPAP    Urinary incontinence     Vascular disorder 2023      Past Surgical History:   Procedure Laterality Date    ABDOMINAL AORTIC ANEURYSM REPAIR      Stented    ADENOIDECTOMY      BACK SURGERY  1985    CARDIAC CATHETERIZATION Left 10/19/2022    Procedure: Cardiac Left Heart Cath;  Surgeon: Danielle Pereira MD;  Location: [facility];  Service: Cardiology    CARDIAC ELECTROPHYSIOLOGY PROCEDURE Left 12/16/2024    Procedure: Cardiac pacer implant;  Surgeon: Danielle Pereira MD;  Location: [facility];  Service: Cardiology    CARDIAC SURGERY  2002    3 cardiac bypass then angioplasty 7/2020    CHOLECYSTECTOMY      COLONOSCOPY      CORONARY ARTERY BYPASS GRAFT      IR LOWER EXTREMITY ANGIOGRAM  11/01/2023    IR LOWER EXTREMITY ANGIOGRAM  08/07/2024    IR LOWER EXTREMITY ANGIOGRAM  01/07/2025    LAMINECTOMY  1990    NE BYPASS W/VEIN FEMORAL-POPLITEAL Right 11/16/2023    Procedure: BYPASS FEMORAL-POPLITEAL WITH CRYO VEIN, RIGHT FEMORAL ENDARTERECTOMY;  Surgeon: Vasquez Clark MD;  Location: [facility];  Service: Vascular    NE BYPASS W/VEIN FEMORAL-POPLITEAL Left 08/30/2024    Procedure: left lower extremity above knee popliteal to below knee popliteal artery bypass with PTFE graft;  Surgeon: Vasquez Clark MD;  Location: [facility];  Service: Vascular    NE SLCTV CATHJ 3RD+ ORD SLCTV ABDL PEL/LXTR BRNCH Right 11/01/2023    Procedure:  ARTERIOGRAM Right lower extremity arteriogram with CO2 via right groin access;  Surgeon: Vasquez Clark MD;  Location: [facility];  Service: Vascular    MN SLCTV CATHJ 3RD+ ORD SLCTV ABDL PEL/LXTR BRNCH Left 08/07/2024    Procedure: diagnostic LLE Arteriogram;  Surgeon: Vasquez Clark MD;  Location: [facility];  Service: Vascular    PROSTATE SURGERY      PROSTATECTOMY      TONSILLECTOMY      UPPER GASTROINTESTINAL ENDOSCOPY  2/14/2024    URINARY SPHINCTER IMPLANT        Family History   Problem Relation Age of Onset    Diabetes Mother     Varicose Veins Mother     Alcohol abuse Father     Heart disease Brother     Cancer Sister         Thyroid    Cancer Sister         Colon    Cancer Brother         Throat    Thyroid disease Brother     Cancer Brother     Colon cancer Brother     Diabetes Sister     Thyroid disease Sister     Colon cancer Sister     Colon cancer Sister     Colon cancer Sister     Mental illness Neg Hx       Social History[1]   E-Cigarette/Vaping    E-Cigarette Use Never User       E-Cigarette/Vaping Substances    Nicotine No     THC No     CBD No     Flavoring No     Other No     Unknown No       I have reviewed and agree with the history as documented.     81-year-old male who presents the emergency department for low blood pressure, near syncope.  Patient had an episode about a month ago where he passed out.  Was seen in the ED, workup was negative, and he was discharged.  He does report intermittent black spots in her peripheral vision and lightheadedness.  Today was at the endocrinology office for his diabetes.  His blood pressure was checked and was low in the 60s.  Subsequently sent to the ED for further evaluation.  EMS reported 80 systolic and on arrival patient with a blood pressure in the 90s.  Asymptomatic on my assessment.  No chest pain, shortness of breath, palpitations.  No other complaints or concerns.          Review of Systems   Neurological:  Positive for  syncope.   All other systems reviewed and are negative.          Objective       ED Triage Vitals   Temperature Pulse Blood Pressure Respirations SpO2 Patient Position - Orthostatic VS   05/15/25 1641 05/15/25 1641 05/15/25 1645 05/15/25 1641 05/15/25 1641 05/15/25 1858   97.9 °F (36.6 °C) 71 102/59 20 98 % Lying - Orthostatic VS      Temp Source Heart Rate Source BP Location FiO2 (%) Pain Score    05/15/25 1641 05/15/25 1641 05/15/25 1858 -- --    Oral Monitor Right arm        Vitals      Date and Time Temp Pulse SpO2 Resp BP Pain Score FACES Pain Rating User   05/15/25 1915 -- 69 98 % 17 126/68 -- -- SEBASTIAN   05/15/25 1900 -- 76 -- -- 92/55 -- -- SEBASTIAN   05/15/25 1859 -- 74 -- -- 109/65 -- -- SEBASTIAN   05/15/25 1858 -- 72 -- -- 114/69 -- -- SEBASTIAN   05/15/25 1715 -- 70 -- 17 97/57 -- -- NM   05/15/25 1645 -- 70 -- 20 102/59 -- -- SEBASTIAN   05/15/25 1641 97.9 °F (36.6 °C) 71 98 % 20 -- -- -- SEBASTIAN            Physical Exam  Vitals and nursing note reviewed.   Constitutional:       General: He is not in acute distress.     Appearance: He is well-developed. He is not ill-appearing, toxic-appearing or diaphoretic.   HENT:      Head: Normocephalic and atraumatic.      Right Ear: External ear normal.      Left Ear: External ear normal.      Nose: Nose normal.     Eyes:      General: Lids are normal. No scleral icterus.      Cardiovascular:      Rate and Rhythm: Normal rate and regular rhythm.      Heart sounds: Normal heart sounds. No murmur heard.     No friction rub. No gallop.   Pulmonary:      Effort: Pulmonary effort is normal. No respiratory distress.      Breath sounds: Normal breath sounds. No wheezing or rales.   Abdominal:      Palpations: Abdomen is soft.      Tenderness: There is no abdominal tenderness. There is no guarding or rebound.     Musculoskeletal:         General: No deformity. Normal range of motion.      Cervical back: Normal range of motion and neck supple.     Skin:     General: Skin is warm and dry.     Neurological:       General: No focal deficit present.      Mental Status: He is alert.     Psychiatric:         Mood and Affect: Mood normal.         Behavior: Behavior normal.         Results Reviewed       Procedure Component Value Units Date/Time    Protime-INR [[id number]]  (Normal) Collected: 05/15/25 1711    Lab Status: Final result Specimen: Blood from Arm, Left Updated: 05/15/25 1736     Protime 14.4 seconds      INR 1.07    Narrative:      INR Therapeutic Range    Indication                                             INR Range      Atrial Fibrillation                                               2.0-3.0  Hypercoagulable State                                    2.0.2.3  Left Ventricular Asist Device                            2.0-3.0  Mechanical Heart Valve                                  -    Aortic(with afib, MI, embolism, HF, LA enlargement,    and/or coagulopathy)                                     2.0-3.0 (2.5-3.5)     Mitral                                                             2.5-3.5  Prosthetic/Bioprosthetic Heart Valve               2.0-3.0  Venous thromboembolism (VTE: VT, PE        2.0-3.0    APTT [[id number]]  (Normal) Collected: 05/15/25 1711    Lab Status: Final result Specimen: Blood from Arm, Left Updated: 05/15/25 1736     PTT 27 seconds     TSH, 3rd generation with Free T4 reflex [[id number]]  (Normal) Collected: 05/15/25 1651    Lab Status: Final result Specimen: Blood from Arm, Left Updated: 05/15/25 1734     TSH 3RD GENERATON 2.032 uIU/mL     Comprehensive metabolic panel [[id number]]  (Abnormal) Collected: 05/15/25 1651    Lab Status: Final result Specimen: Blood from Arm, Left Updated: 05/15/25 1729     Sodium 137 mmol/L      Potassium 5.2 mmol/L      Chloride 101 mmol/L      CO2 24 mmol/L      ANION GAP 12 mmol/L      BUN 43 mg/dL      Creatinine 1.81 mg/dL      Glucose 130 mg/dL      Calcium 9.3 mg/dL      AST 37 U/L      ALT 19 U/L      Alkaline Phosphatase 39 U/L      Total Protein 7.8  [Respiratory Effort] : normal respiratory effort g/dL      Albumin 4.2 g/dL      Total Bilirubin 0.57 mg/dL      eGFR 34 ml/min/1.73sq m     Narrative:      [employer] Kid[employer] guidelines for Chronic Kidney Disease (CKD):     Stage 1 with normal or high GFR (GFR > 90 mL/min/1.73 square meters)    Stage 2 Mild CKD (GFR = 60-89 mL/min/1.73 square meters)    Stage 3A Moderate CKD (GFR = 45-59 mL/min/1.73 square meters)    Stage 3B Moderate CKD (GFR = 30-44 mL/min/1.73 square meters)    Stage 4 Severe CKD (GFR = 15-29 mL/min/1.73 square meters)    Stage 5 End Stage CKD (GFR <15 mL/min/1.73 square meters)  Note: GFR calculation is accurate only with a steady state creatinine    Lactic acid [[id number]]  (Normal) Collected: 05/15/25 1651    Lab Status: Final result Specimen: Blood from Arm, Left Updated: 05/15/25 1729     LACTIC ACID 1.6 mmol/L     Narrative:      Result may be elevated if tourniquet was used during collection.    Procalcitonin [[id number]]  (Normal) Collected: 05/15/25 1651    Lab Status: Final result Specimen: Blood from Arm, Left Updated: 05/15/25 1728     Procalcitonin 0.07 ng/ml     Blood culture #2 [[id number]] Collected: 05/15/25 1711    Lab Status: In process Specimen: Blood from Arm, Right Updated: 05/15/25 1720    CBC and differential [[id number]]  (Abnormal) Collected: 05/15/25 1651    Lab Status: Final result Specimen: Blood from Arm, Left Updated: 05/15/25 1700     WBC 6.80 Thousand/uL      RBC 4.87 Million/uL      Hemoglobin 13.4 g/dL      Hematocrit 43.4 %      MCV 89 fL      MCH 27.5 pg      MCHC 30.9 g/dL      RDW 15.3 %      MPV 12.0 fL      Platelets 216 Thousands/uL      nRBC 0 /100 WBCs      Segmented % 68 %      Immature Grans % 0 %      Lymphocytes % 22 %      Monocytes % 8 %      Eosinophils Relative 2 %      Basophils Relative 0 %      Absolute Neutrophils 4.60 Thousands/µL      Absolute Immature Grans 0.02 Thousand/uL      Absolute Lymphocytes 1.51 Thousands/µL      Absolute Monocytes 0.51 Thousand/µL       Eosinophils Absolute 0.13 Thousand/µL      Basophils Absolute 0.03 Thousands/µL     Blood culture #1 [[id number]] Collected: 05/15/25 1651    Lab Status: In process Specimen: Blood from Arm, Left Updated: 05/15/25 1658    UA w Reflex to Microscopic w Reflex to Culture [[id number]]     Lab Status: No result Specimen: Urine             XR chest 1 view portable   Final Interpretation by Soraya Trinidad MD (05/15 1802)      No acute cardiopulmonary disease.            Workstation performed: [id number]             ECG 12 Lead Documentation Only    Date/Time: 5/15/2025 7:45 PM    Performed by: Shashi Stevens DO  Authorized by: Shashi Stevens DO    ECG reviewed by me, the ED Provider: yes    Patient location:  ED  Interpretation:     Interpretation: abnormal    Rate:     ECG rate:  69    ECG rate assessment: normal    Rhythm:     Rhythm: paced    Pacing:     Capture:  Complete    Type of pacing:  AV  QRS:     QRS axis:  Left      ED Medication and Procedure Management   Prior to Admission Medications   Prescriptions Last Dose Informant Patient Reported? Taking?   Blood Glucose Monitoring Suppl (OneTouch Verio Reflect) w/Device KIT  Self No No   Sig: Check blood sugars twice daily. Please substitute with appropriate alternative as covered by patient's insurance. Dx: E11.65   DULoxetine (CYMBALTA) 30 mg delayed release capsule  Self No No   Sig: Take 1 capsule (30 mg total) by mouth daily   Empagliflozin (Jardiance) 25 MG TABS  Self No No   Sig: TAKE 1 TABLET BY MOUTH DAILY   Januvia 100 MG tablet  Self No No   Sig: TAKE ONE TABLET BY MOUTH DAILY   Multiple Vitamins-Minerals (MULTIVITAMIN MEN 50+ PO)  Self Yes No   Sig: Take by mouth in the morning.   Omega-3 Fatty Acids (fish oil) 1,000 mg  Self Yes No   Sig: Take 4,000 mg by mouth in the morning and 4,000 mg in the evening.   OneTouch Delica Lancets 33G MISC  Self No No   Sig: Check blood sugars twice daily. Please substitute with appropriate alternative as covered  [Normal Heart Sounds] : normal heart sounds [2+] : left 2+ by patient's insurance. Dx: E11.65   Sure Comfort Pen Needles 32G X 4 MM MISC  Self No No   Sig: TAKE ONE TABLET BY MOUTH DAILY   acetaminophen (TYLENOL) 325 mg tablet  Self No No   Sig: Take 2 tablets (650 mg total) by mouth every 6 (six) hours as needed for mild pain   atorvastatin (LIPITOR) 40 mg tablet  Self No No   Sig: Take 1 tablet (40 mg total) by mouth daily with dinner   clopidogrel (PLAVIX) 75 mg tablet  Self No No   Sig: Take 1 tablet (75 mg total) by mouth daily   cyclobenzaprine (FLEXERIL) 10 mg tablet  Self No No   Sig: Take 0.5 tablets (5 mg total) by mouth 2 (two) times a day as needed for muscle spasms for up to 5 days   famotidine (PEPCID) 40 MG tablet  Self No No   Sig: Take 1 tablet (40 mg total) by mouth daily at bedtime   fenofibrate 160 MG tablet  Self No No   Sig: TAKE 1 TABLET BY MOUTH DAILY   ferrous sulfate 324 (65 Fe) mg  Self No No   Sig: Take 1 tablet (324 mg total) by mouth every other day   glucose blood (OneTouch Verio) test strip  Self No No   Sig: TEST TWICE A DAY   insulin glargine (LANTUS) 100 units/mL subcutaneous injection  Self No No   Sig: Inject 15 Units under the skin daily at bedtime   isosorbide mononitrate (IMDUR) 30 mg 24 hr tablet  Self No No   Sig: Take 3 tablets (90 mg total) by mouth daily   lidocaine (LIDODERM) 5 %   No No   Sig: Apply 1 patch topically over 12 hours daily for 2 days Remove & Discard patch within 12 hours or as directed by MD   metFORMIN (GLUCOPHAGE) 500 mg tablet  Self No No   Sig: TAKE 1 TABLET BY MOUTH TWICE  DAILY WITH MEALS   metoprolol succinate (TOPROL-XL) 100 mg 24 hr tablet  Self No No   Sig: Take 1 tablet (100 mg total) by mouth daily   nitroglycerin (NITROSTAT) 0.4 mg SL tablet  Self No No   Sig: Place 1 tablet (0.4 mg total) under the tongue every 5 (five) minutes as needed for chest pain   Patient not taking: Reported on 1/15/2025   ondansetron (ZOFRAN) 4 mg tablet  Self No No   Sig: Take 1 tablet (4 mg total) by mouth every 12  (twelve) hours as needed for nausea or vomiting   oxyCODONE-acetaminophen (PERCOCET) 5-325 mg per tablet  Self Yes No   Sig: Take 1 tablet by mouth as needed in the morning and 1 tablet as needed in the evening for moderate pain or severe pain.   pantoprazole (PROTONIX) 40 mg tablet  Self No No   Sig: TAKE 1 TABLET BY MOUTH DAILY   pentoxifylline (TRENtal) 400 mg ER tablet  Self No No   Sig: TAKE 1 TABLET BY MOUTH 3 TIMES  DAILY WITH MEALS   pregabalin (LYRICA) 150 mg capsule  Self No No   Sig: Take 1 capsule (150 mg total) by mouth 3 (three) times a day   ranolazine (RANEXA) 1000 MG SR tablet  Self No No   Sig: Take 1 tablet (1,000 mg total) by mouth 2 (two) times a day   torsemide (DEMADEX) 20 mg tablet  Self No No   Sig: TAKE 1 TABLET BY MOUTH DAILY      Facility-Administered Medications: None     Patient's Medications   Discharge Prescriptions    No medications on file     No discharge procedures on file.  ED SEPSIS DOCUMENTATION   Time reflects when diagnosis was documented in both MDM as applicable and the Disposition within this note       Time User Action Codes Description Comment    5/15/2025  7:26 PM Shashi Stevens Add [I95.9] Hypotension     5/15/2025  7:26 PM Shashi Stevens Add [I95.1] Orthostatic hypotension                    [1]   Social History  Tobacco Use    Smoking status: Former     Current packs/day: 0.00     Average packs/day: 1.5 packs/day for 35.0 years (52.5 ttl pk-yrs)     Types: Cigarettes     Start date: 1956     Quit date:      Years since quittin.3     Passive exposure: Past    Smokeless tobacco: Never   Vaping Use    Vaping status: Never Used   Substance Use Topics    Alcohol use: Yes     Alcohol/week: 14.0 standard drinks of alcohol     Types: 14 Cans of beer per week     Comment: stopped last few months    Drug use: Never        Shashi Stevens DO  05/15/25 1948     [No Rash or Lesion] : No rash or lesion [Alert] : alert [Calm] : calm [Ankle Swelling (On Exam)] : not present [Abdomen Tenderness] : ~T ~M No abdominal tenderness [Abdominal Bruit] : no abdominal bruit  [de-identified] : WN/WD, NAD [de-identified] : NC/AT [de-identified] : supple [de-identified] : +FROM 5/5x4 [de-identified] : grossly intact

## 2025-08-19 ENCOUNTER — APPOINTMENT (OUTPATIENT)
Dept: VASCULAR SURGERY | Facility: CLINIC | Age: 75
End: 2025-08-19
Payer: MEDICARE

## 2025-08-19 DIAGNOSIS — I71.21 ANEURYSM OF THE ASCENDING AORTA, WITHOUT RUPTURE: ICD-10-CM

## 2025-08-19 DIAGNOSIS — I72.3 ANEURYSM OF ILIAC ARTERY: ICD-10-CM

## 2025-08-19 DIAGNOSIS — I71.43 INFRARENAL ABDOMINAL AORTIC ANEURYSM, WITHOUT RUPTURE: ICD-10-CM

## 2025-08-19 PROCEDURE — 99203 OFFICE O/P NEW LOW 30 MIN: CPT

## 2025-08-19 PROCEDURE — 93978 VASCULAR STUDY: CPT

## 2025-09-18 ENCOUNTER — NON-APPOINTMENT (OUTPATIENT)
Age: 75
End: 2025-09-18

## 2025-09-18 ENCOUNTER — APPOINTMENT (OUTPATIENT)
Dept: PULMONOLOGY | Facility: CLINIC | Age: 75
End: 2025-09-18
Payer: MEDICARE

## 2025-09-18 VITALS
WEIGHT: 112 LBS | BODY MASS INDEX: 17.37 KG/M2 | HEIGHT: 67.5 IN | TEMPERATURE: 97.3 F | OXYGEN SATURATION: 96 % | HEART RATE: 80 BPM | SYSTOLIC BLOOD PRESSURE: 114 MMHG | DIASTOLIC BLOOD PRESSURE: 78 MMHG

## 2025-09-18 DIAGNOSIS — J44.9 CHRONIC OBSTRUCTIVE PULMONARY DISEASE, UNSPECIFIED: ICD-10-CM

## 2025-09-18 PROCEDURE — 36415 COLL VENOUS BLD VENIPUNCTURE: CPT

## 2025-09-18 PROCEDURE — 99204 OFFICE O/P NEW MOD 45 MIN: CPT | Mod: 25,GC

## 2025-09-18 PROCEDURE — 95012 NITRIC OXIDE EXP GAS DETER: CPT

## 2025-09-18 PROCEDURE — 94010 BREATHING CAPACITY TEST: CPT

## 2025-09-18 RX ORDER — ALBUTEROL SULFATE 90 UG/1
108 (90 BASE) INHALANT RESPIRATORY (INHALATION)
Qty: 1 | Refills: 0 | Status: ACTIVE | COMMUNITY
Start: 2025-09-18 | End: 1900-01-01

## 2025-09-18 RX ORDER — FLUTICASONE FUROATE, UMECLIDINIUM BROMIDE AND VILANTEROL TRIFENATATE 100; 62.5; 25 UG/1; UG/1; UG/1
100-62.5-25 POWDER RESPIRATORY (INHALATION)
Qty: 3 | Refills: 3 | Status: ACTIVE | COMMUNITY
Start: 2025-09-18 | End: 1900-01-01

## 2025-09-18 RX ORDER — ALBUTEROL SULFATE 2.5 MG/3ML
(2.5 MG/3ML) SOLUTION RESPIRATORY (INHALATION)
Qty: 1 | Refills: 11 | Status: ACTIVE | COMMUNITY
Start: 2025-09-18 | End: 1900-01-01

## 2025-09-19 LAB — EOSINOPHIL # BLD MANUAL: 370 /UL
